# Patient Record
Sex: MALE | Race: BLACK OR AFRICAN AMERICAN
[De-identification: names, ages, dates, MRNs, and addresses within clinical notes are randomized per-mention and may not be internally consistent; named-entity substitution may affect disease eponyms.]

---

## 2020-05-14 ENCOUNTER — HOSPITAL ENCOUNTER (OUTPATIENT)
Dept: HOSPITAL 100 - ED | Age: 30
Setting detail: OBSERVATION
LOS: 1 days | Discharge: HOME | End: 2020-05-15
Payer: MEDICAID

## 2020-05-14 VITALS — BODY MASS INDEX: 22 KG/M2 | BODY MASS INDEX: 23.3 KG/M2

## 2020-05-14 VITALS
SYSTOLIC BLOOD PRESSURE: 143 MMHG | HEART RATE: 64 BPM | OXYGEN SATURATION: 96 % | RESPIRATION RATE: 16 BRPM | DIASTOLIC BLOOD PRESSURE: 107 MMHG

## 2020-05-14 VITALS
RESPIRATION RATE: 16 BRPM | OXYGEN SATURATION: 97 % | DIASTOLIC BLOOD PRESSURE: 66 MMHG | SYSTOLIC BLOOD PRESSURE: 112 MMHG | HEART RATE: 60 BPM

## 2020-05-14 VITALS
SYSTOLIC BLOOD PRESSURE: 176 MMHG | HEART RATE: 104 BPM | TEMPERATURE: 99.4 F | DIASTOLIC BLOOD PRESSURE: 116 MMHG | OXYGEN SATURATION: 96 % | RESPIRATION RATE: 16 BRPM

## 2020-05-14 VITALS
HEART RATE: 68 BPM | RESPIRATION RATE: 16 BRPM | SYSTOLIC BLOOD PRESSURE: 113 MMHG | DIASTOLIC BLOOD PRESSURE: 64 MMHG | OXYGEN SATURATION: 98 %

## 2020-05-14 DIAGNOSIS — G40.909: Primary | ICD-10-CM

## 2020-05-14 DIAGNOSIS — F17.200: ICD-10-CM

## 2020-05-14 DIAGNOSIS — Z79.899: ICD-10-CM

## 2020-05-14 LAB
ALANINE AMINOTRANSFER ALT/SGPT: 24 U/L (ref 16–61)
ALBUMIN SERPL-MCNC: 4.4 G/DL (ref 3.2–5)
ALCOHOL, BLOOD (MEDICAL)-SERUM: < 3 MG/DL
ALKALINE PHOSPHATASE: 45 U/L (ref 45–117)
AMPHET UR-MCNC: NEGATIVE NG/ML
ANION GAP: 9 (ref 5–15)
AST(SGOT): 25 U/L (ref 15–37)
BARBITURATE URINE VISTA: NEGATIVE
BENZODIAZEPINE URINE VISTA: NEGATIVE
BILIRUB DIRECT SERPL-MCNC: 0.2 MG/DL (ref 0–0.3)
BUN SERPL-MCNC: 10 MG/DL (ref 7–18)
BUN/CREAT RATIO: 7.7 RATIO (ref 10–20)
CALCIUM SERPL-MCNC: 9.7 MG/DL (ref 8.5–10.1)
CARBON DIOXIDE: 26 MMOL/L (ref 21–32)
CHLORIDE: 103 MMOL/L (ref 98–107)
COCAINE URINE VISTA: NEGATIVE
DEPRECATED RDW RBC: 38.4 FL (ref 35.1–43.9)
DRUG CONFIRMATION TO FOLLOW?: (no result)
ECSTACY URINE VISTA: NEGATIVE
ERYTHROCYTE [DISTWIDTH] IN BLOOD: 14.5 % (ref 11.6–14.6)
EST GLOM FILT RATE - AFR AMER: 90 ML/MIN (ref 60–?)
ESTIMATED CREATININE CLEARANCE: 102.44 ML/MIN
GLOBULIN: 4.3 G/DL (ref 2.2–4.2)
GLUCOSE: 136 MG/DL (ref 74–106)
HCT VFR BLD AUTO: 40.9 % (ref 40–54)
HEMOGLOBIN: 12.7 G/DL (ref 13–16.5)
HGB BLD-MCNC: 12.7 G/DL (ref 13–16.5)
IMMATURE GRANULOCYTES COUNT: 0.02 X10^3/UL (ref 0–0)
KETONE-DIPSTICK: 5 MG/DL
MCV RBC: 74.2 FL (ref 80–94)
MEAN CORP HGB CONC: 31.1 G/DL (ref 32–36)
MEAN PLATELET VOL.: 10.5 FL (ref 6.2–12)
METHADONE URINE VISTA: NEGATIVE
MUCOUS THREADS URNS QL MICRO: (no result) /HPF
NRBC FLAGGED BY ANALYZER: 0 % (ref 0–5)
PCP UR QL: NEGATIVE
PH UR: 6 [PH]
PLATELET # BLD: 222 K/MM3 (ref 150–450)
PLATELET COUNT: 222 K/MM3 (ref 150–450)
POTASSIUM: 3.1 MMOL/L (ref 3.5–5.1)
PROT UR QL STRIP.AUTO: 30 MG/DL
RBC # BLD AUTO: 5.51 M/MM3 (ref 4.6–6.2)
RBC DISTRIBUTION WIDTH CV: 14.5 % (ref 11.6–14.6)
RBC DISTRIBUTION WIDTH SD: 38.4 FL (ref 35.1–43.9)
RBC UR QL: (no result) /HPF (ref 0–5)
SP GR UR: 1.01 (ref 1–1.03)
SQUAMOUS URNS QL MICRO: (no result) /HPF (ref 0–5)
THC URINE VISTA: POSITIVE
URINE PRESERVATIVE: (no result)
WBC # BLD AUTO: 8.2 K/MM3 (ref 4.4–11)
WHITE BLOOD COUNT: 8.2 K/MM3 (ref 4.4–11)

## 2020-05-14 PROCEDURE — 36415 COLL VENOUS BLD VENIPUNCTURE: CPT

## 2020-05-14 PROCEDURE — 80048 BASIC METABOLIC PNL TOTAL CA: CPT

## 2020-05-14 PROCEDURE — G0480 DRUG TEST DEF 1-7 CLASSES: HCPCS

## 2020-05-14 PROCEDURE — 99285 EMERGENCY DEPT VISIT HI MDM: CPT

## 2020-05-14 PROCEDURE — 99218: CPT

## 2020-05-14 PROCEDURE — 96361 HYDRATE IV INFUSION ADD-ON: CPT

## 2020-05-14 PROCEDURE — 70450 CT HEAD/BRAIN W/O DYE: CPT

## 2020-05-14 PROCEDURE — A4216 STERILE WATER/SALINE, 10 ML: HCPCS

## 2020-05-14 PROCEDURE — 84443 ASSAY THYROID STIM HORMONE: CPT

## 2020-05-14 PROCEDURE — 97802 MEDICAL NUTRITION INDIV IN: CPT

## 2020-05-14 PROCEDURE — G0378 HOSPITAL OBSERVATION PER HR: HCPCS

## 2020-05-14 PROCEDURE — 80307 DRUG TEST PRSMV CHEM ANLYZR: CPT

## 2020-05-14 PROCEDURE — 80320 DRUG SCREEN QUANTALCOHOLS: CPT

## 2020-05-14 PROCEDURE — 80076 HEPATIC FUNCTION PANEL: CPT

## 2020-05-14 PROCEDURE — 81001 URINALYSIS AUTO W/SCOPE: CPT

## 2020-05-14 PROCEDURE — 96375 TX/PRO/DX INJ NEW DRUG ADDON: CPT

## 2020-05-14 PROCEDURE — 96372 THER/PROPH/DIAG INJ SC/IM: CPT

## 2020-05-14 PROCEDURE — 83735 ASSAY OF MAGNESIUM: CPT

## 2020-05-14 PROCEDURE — 83605 ASSAY OF LACTIC ACID: CPT

## 2020-05-14 PROCEDURE — 85025 COMPLETE CBC W/AUTO DIFF WBC: CPT

## 2020-05-14 PROCEDURE — 93005 ELECTROCARDIOGRAM TRACING: CPT

## 2020-05-14 PROCEDURE — 96365 THER/PROPH/DIAG IV INF INIT: CPT

## 2020-05-14 RX ADMIN — LEVETIRACETAM 400 MG: 1000 INJECTION, SOLUTION INTRAVENOUS at 21:16

## 2020-05-14 RX ADMIN — SODIUM CHLORIDE 999 ML: 9 INJECTION, SOLUTION INTRAVENOUS at 22:00

## 2020-05-14 RX ADMIN — ZIPRASIDONE MESYLATE 20 MG: 20 INJECTION, POWDER, LYOPHILIZED, FOR SOLUTION INTRAMUSCULAR at 20:49

## 2020-05-15 VITALS
RESPIRATION RATE: 23 BRPM | DIASTOLIC BLOOD PRESSURE: 103 MMHG | TEMPERATURE: 98.24 F | SYSTOLIC BLOOD PRESSURE: 144 MMHG | OXYGEN SATURATION: 100 % | HEART RATE: 76 BPM

## 2020-05-15 VITALS
OXYGEN SATURATION: 97 % | RESPIRATION RATE: 18 BRPM | TEMPERATURE: 99.6 F | DIASTOLIC BLOOD PRESSURE: 96 MMHG | SYSTOLIC BLOOD PRESSURE: 159 MMHG | HEART RATE: 68 BPM

## 2020-05-15 VITALS
TEMPERATURE: 99.6 F | SYSTOLIC BLOOD PRESSURE: 159 MMHG | HEART RATE: 68 BPM | OXYGEN SATURATION: 97 % | DIASTOLIC BLOOD PRESSURE: 96 MMHG | RESPIRATION RATE: 18 BRPM

## 2020-05-15 VITALS
OXYGEN SATURATION: 100 % | HEART RATE: 63 BPM | TEMPERATURE: 99.3 F | RESPIRATION RATE: 26 BRPM | SYSTOLIC BLOOD PRESSURE: 156 MMHG | DIASTOLIC BLOOD PRESSURE: 95 MMHG

## 2020-05-15 VITALS
HEART RATE: 58 BPM | TEMPERATURE: 96.98 F | RESPIRATION RATE: 15 BRPM | SYSTOLIC BLOOD PRESSURE: 121 MMHG | OXYGEN SATURATION: 96 % | DIASTOLIC BLOOD PRESSURE: 83 MMHG

## 2020-05-15 VITALS
SYSTOLIC BLOOD PRESSURE: 121 MMHG | RESPIRATION RATE: 16 BRPM | OXYGEN SATURATION: 96 % | HEART RATE: 60 BPM | DIASTOLIC BLOOD PRESSURE: 99 MMHG

## 2020-05-15 VITALS — HEART RATE: 85 BPM

## 2020-05-15 VITALS — HEART RATE: 70 BPM

## 2020-05-15 VITALS
OXYGEN SATURATION: 99 % | HEART RATE: 99 BPM | SYSTOLIC BLOOD PRESSURE: 145 MMHG | TEMPERATURE: 99.8 F | DIASTOLIC BLOOD PRESSURE: 85 MMHG | RESPIRATION RATE: 25 BRPM

## 2020-05-15 VITALS — HEART RATE: 99 BPM

## 2020-05-15 VITALS — HEART RATE: 71 BPM

## 2020-05-15 LAB
ANION GAP: 7 (ref 5–15)
BUN SERPL-MCNC: 8 MG/DL (ref 7–18)
BUN/CREAT RATIO: 9.1 RATIO (ref 10–20)
CALCIUM SERPL-MCNC: 9 MG/DL (ref 8.5–10.1)
CARBON DIOXIDE: 27 MMOL/L (ref 21–32)
CHLORIDE: 104 MMOL/L (ref 98–107)
EST GLOM FILT RATE - AFR AMER: 133 ML/MIN (ref 60–?)
ESTIMATED CREATININE CLEARANCE: 133.99 ML/MIN
GLUCOSE: 83 MG/DL (ref 74–106)
MAGNESIUM: 2.2 MG/DL (ref 1.6–2.6)
POTASSIUM: 3.7 MMOL/L (ref 3.5–5.1)

## 2020-05-15 RX ADMIN — LEVETIRACETAM 1000 MG: 100 SOLUTION ORAL at 10:24

## 2020-05-15 RX ADMIN — SODIUM CHLORIDE, PRESERVATIVE FREE 0 ML: 5 INJECTION INTRAVENOUS at 02:36

## 2020-05-15 RX ADMIN — SODIUM CHLORIDE 150 ML: 9 INJECTION, SOLUTION INTRAVENOUS at 02:36

## 2022-04-09 ENCOUNTER — HISTORICAL (OUTPATIENT)
Dept: ADMINISTRATIVE | Facility: HOSPITAL | Age: 32
End: 2022-04-09
Payer: MEDICAID

## 2022-04-26 VITALS
WEIGHT: 225.75 LBS | BODY MASS INDEX: 30.58 KG/M2 | HEIGHT: 72 IN | SYSTOLIC BLOOD PRESSURE: 142 MMHG | DIASTOLIC BLOOD PRESSURE: 96 MMHG

## 2022-05-26 NOTE — TELEPHONE ENCOUNTER
----- Message from Brittani Vieyra sent at 5/25/2022  1:37 PM CDT -----  Regarding: Call pt  Pt would like a call he has a question about his medication Keppra. He can be reached @ [phone].          Thank You  Radha SORIA

## 2022-05-27 RX ORDER — LEVETIRACETAM 500 MG/1
500 TABLET ORAL 2 TIMES DAILY
COMMUNITY
Start: 2022-05-07 | End: 2022-05-27 | Stop reason: SDUPTHER

## 2022-05-27 RX ORDER — LEVETIRACETAM 500 MG/1
500 TABLET ORAL 2 TIMES DAILY
Qty: 60 TABLET | Refills: 3 | Status: SHIPPED | OUTPATIENT
Start: 2022-05-27 | End: 2022-12-29 | Stop reason: SDUPTHER

## 2022-05-27 NOTE — TELEPHONE ENCOUNTER
Patient was scheduled for 5/27/2022, but had to R/S.    Last OV 4/6/2021  Next OV 8/17/2022    Informed patient will only have enough refills until next appointment.    Mr Mendosa verbalized understanding.

## 2022-08-16 PROBLEM — G40.309 IDIOPATHIC GENERALIZED EPILEPSY: Status: ACTIVE | Noted: 2022-08-16

## 2022-08-16 PROBLEM — G40.309 IDIOPATHIC GENERALIZED EPILEPSY: Chronic | Status: ACTIVE | Noted: 2022-08-16

## 2022-12-29 RX ORDER — LEVETIRACETAM 500 MG/1
500 TABLET ORAL 2 TIMES DAILY
Qty: 60 TABLET | Refills: 3 | Status: SHIPPED | OUTPATIENT
Start: 2022-12-29 | End: 2023-03-30 | Stop reason: SDUPTHER

## 2022-12-29 NOTE — TELEPHONE ENCOUNTER
----- Message from Brittani Vieyra sent at 12/29/2022  3:51 PM CST -----  Regarding: med refill  Pt called for a refill of KEPPRA to be sent to [facility] in [place].  Pt missed his appt's due to being in college and working and is r/s for 3/30/23. Pt would also like to know if he is a candidate for telemed as he lives in [place].Pt can be reached @ [phone]        Thank You  Radha GU

## 2023-03-29 NOTE — PROGRESS NOTES
"[facility] Neurology Follow Up Office Visit Note    Last Visit Date: 4/6/2021  Current Visit Date:  03/30/2023    Chief Complaint:     Chief Complaint   Patient presents with    Seizures     Has not had any seizures in almost a year; Medications working well       History of Present Illness:      This is 32 y.o. right hand dominant male with history of Depression, HTN, who is referred for generalized seizure disorder, currently stable. Has since been lost to follow up.      Age of Onset - 25 year old    Semiology - LOC. Woke up with nausea and muscle achiness. Mostly nocturnal but had 1 while awake    Frequency - last seizure was 6/4/2021 when he misses a dose of Keppra    Provocation Factors - when he misses a dose    Risk Factors -  - Family history of seizures? denied  - History of focal CNS lesions? denied  - History of CNS infection? denied  - History of Head Trauma with prolonged LOC? denied  - History of childhood seizures, including febrile seizures? denied  - History of developmental delay? denied  - History of underlying mood disorder? Depression. "Cranky" States that he is having mood swings  - Illicit drug use? denied  - Alcohol use? denied    Medications:     Current AEDs:   mg twice a day: states good compliance.     Prior AEDs:  Denied     Surgical Intervention & Devices:     - VNS:  - DBS  - RNS:  - Lobectomy:    Labs:     No results found for this or any previous visit.    Studies:      - routine EEG 5/2020 @ [facility] - intermittent diffuse slowing.  - [facility] 8/2017 - I have reviewed the study independently and with the patient. Unremarkable.    Review of Systems:     All other systems reviewed and are negative.    Physical Exams:     Vitals:    03/30/23 1439   BP: 138/78   Pulse: 63   Resp: 14   Temp: 97.8 °F (36.6 °C)       Physical Exam  Vitals and nursing note reviewed.   Constitutional:       Appearance: Normal appearance.   HENT:      Head: Normocephalic and atraumatic.      Nose: Nose normal.     "  Mouth/Throat:      Mouth: Mucous membranes are moist.      Pharynx: Oropharynx is clear.   Eyes:      Conjunctiva/sclera: Conjunctivae normal.   Cardiovascular:      Rate and Rhythm: Normal rate and regular rhythm.      Pulses: Normal pulses.      Heart sounds: Normal heart sounds.   Pulmonary:      Effort: Pulmonary effort is normal.      Breath sounds: Normal breath sounds.   Abdominal:      General: Abdomen is flat.      Palpations: Abdomen is soft.   Musculoskeletal:         General: Normal range of motion.      Cervical back: Normal range of motion.   Neurological:      Mental Status: He is alert.   Psychiatric:         Mood and Affect: Mood normal.            Comprehensive Neurological Exam:  Mental Status: Alert Oriented to Self, Date, and Place. Naming, repetition, reading, and writing wnl. Comprehension wnl. No dysarthria.   CN II - XII: BIBI, No APD, VA grossly intact to finger counting at 6 ft, VFFC, No ptosis OU, EOMI without nystagmus, LT/Temp symmetric in CN V1-3 distribution, Hearing grossly intact, Face Symmetric, Tongue and Uvula midline, Trapezius symmetric bilateral.   Motor: tone and bulk wnl throughout, no abnormal involuntary or voluntary movements, 5/5 to confrontation, Fine finger movements wnl b/l, No pronator drift.   Sensory: LT, Proprioception, Vibration, PP, Temp symmetric. No sensory simultagnosia.   Reflexes: 2+ throughout, plantar reflexes downward bilateral.   Cerebellar: FNF wnl b/l, RAHM wnl b/l,   Romberg: Negative  Gait: normal. Heel Gait, Toe Gait, Tandem Gait wnl.     Assessment:     This is 32 y.o. right hand dominant male with history of Depression, HTN, who is referred for generalized seizure disorder, currently stable.    Problem List Items Addressed This Visit          Neuro    Idiopathic generalized epilepsy - Primary (Chronic)       Plan:     [] c/w Keppra 500mg BID    RTC 12 months  with NP    Seizure education provided: including family planning and teratogenicity of  AEDs, risk of uncontrolled seizure disorder, SUDEP. No driving until seizure free for six months ([place] state law). No swimming, climbing heights, or operate heavy machinery without supervision. Patient is advised to avoid Benadryl, Tramadol, Wellbutrin, flashing lights, alcohol, sleep deprivation, and certain anti-biotics that can lower seizure threshold.     I have explained the treatment plan, diagnosis, and prognosis to patient. All questions are answered to the best of my knowledge.     Face to face time 30 minutes, including documentation, chart review, counseling, education, review of test results, relevant medical records, and coordination of care.   I have explained the treatment plan, diagnosis, and prognosis to patient. All questions are answered to the best of my knowledge.         Hannah Cerrato MD   General Neurology  03/30/2023

## 2023-03-30 ENCOUNTER — OFFICE VISIT (OUTPATIENT)
Dept: NEUROLOGY | Facility: CLINIC | Age: 33
End: 2023-03-30
Payer: MEDICAID

## 2023-03-30 VITALS
DIASTOLIC BLOOD PRESSURE: 78 MMHG | TEMPERATURE: 98 F | RESPIRATION RATE: 14 BRPM | SYSTOLIC BLOOD PRESSURE: 138 MMHG | HEART RATE: 63 BPM | WEIGHT: 222 LBS | BODY MASS INDEX: 30.07 KG/M2 | OXYGEN SATURATION: 97 % | HEIGHT: 72 IN

## 2023-03-30 DIAGNOSIS — G40.309 NONINTRACTABLE GENERALIZED IDIOPATHIC EPILEPSY WITHOUT STATUS EPILEPTICUS: Primary | Chronic | ICD-10-CM

## 2023-03-30 PROCEDURE — 99204 OFFICE O/P NEW MOD 45 MIN: CPT | Mod: S$PBB,,, | Performed by: PSYCHIATRY & NEUROLOGY

## 2023-03-30 PROCEDURE — 99204 PR OFFICE/OUTPT VISIT, NEW, LEVL IV, 45-59 MIN: ICD-10-PCS | Mod: S$PBB,,, | Performed by: PSYCHIATRY & NEUROLOGY

## 2023-03-30 PROCEDURE — 1159F PR MEDICATION LIST DOCUMENTED IN MEDICAL RECORD: ICD-10-PCS | Mod: CPTII,,, | Performed by: PSYCHIATRY & NEUROLOGY

## 2023-03-30 PROCEDURE — 3078F DIAST BP <80 MM HG: CPT | Mod: CPTII,,, | Performed by: PSYCHIATRY & NEUROLOGY

## 2023-03-30 PROCEDURE — 3078F PR MOST RECENT DIASTOLIC BLOOD PRESSURE < 80 MM HG: ICD-10-PCS | Mod: CPTII,,, | Performed by: PSYCHIATRY & NEUROLOGY

## 2023-03-30 PROCEDURE — 3075F SYST BP GE 130 - 139MM HG: CPT | Mod: CPTII,,, | Performed by: PSYCHIATRY & NEUROLOGY

## 2023-03-30 PROCEDURE — 3075F PR MOST RECENT SYSTOLIC BLOOD PRESS GE 130-139MM HG: ICD-10-PCS | Mod: CPTII,,, | Performed by: PSYCHIATRY & NEUROLOGY

## 2023-03-30 PROCEDURE — 99213 OFFICE O/P EST LOW 20 MIN: CPT | Mod: PBBFAC | Performed by: PSYCHIATRY & NEUROLOGY

## 2023-03-30 PROCEDURE — 3008F BODY MASS INDEX DOCD: CPT | Mod: CPTII,,, | Performed by: PSYCHIATRY & NEUROLOGY

## 2023-03-30 PROCEDURE — 1159F MED LIST DOCD IN RCRD: CPT | Mod: CPTII,,, | Performed by: PSYCHIATRY & NEUROLOGY

## 2023-03-30 PROCEDURE — 3008F PR BODY MASS INDEX (BMI) DOCUMENTED: ICD-10-PCS | Mod: CPTII,,, | Performed by: PSYCHIATRY & NEUROLOGY

## 2023-03-30 RX ORDER — TALC
1 POWDER (GRAM) TOPICAL
COMMUNITY
End: 2023-07-19

## 2023-03-30 RX ORDER — LEVETIRACETAM 500 MG/1
500 TABLET ORAL 2 TIMES DAILY
Qty: 60 TABLET | Refills: 12 | Status: SHIPPED | OUTPATIENT
Start: 2023-03-30

## 2023-04-10 ENCOUNTER — TELEPHONE (OUTPATIENT)
Dept: NEUROLOGY | Facility: CLINIC | Age: 33
End: 2023-04-10
Payer: MEDICAID

## 2023-04-10 NOTE — TELEPHONE ENCOUNTER
----- Message from Hannah Cerrato MD sent at 4/10/2023  7:22 AM CDT -----  Regarding: FW: Questions and Concerns    ----- Message -----  From: Dottie Mcbride  Sent: 4/7/2023   8:34 AM CDT  To: Saqib Young Staff  Subject: Questions and Concerns                           Patient is asking to speak to the Doctor has questions and concerns

## 2023-04-10 NOTE — TELEPHONE ENCOUNTER
Patient called and has a few questions regarding his Keppra medication, patient questioned if he will have any problems mixing the Keppra with other medication such as melatonin or tylenol? Patient states he hasn't have any side affects to the medication but he did notice he was having trouble sleeping and he wants to know if there is a certain dosage he can take.     Patient is also requesting his results for his scans regarding how his seizure happened in the first place. Patient sounded a little concerned on the phone. He is requesting a callback

## 2023-07-13 ENCOUNTER — TELEPHONE (OUTPATIENT)
Dept: NEUROLOGY | Facility: CLINIC | Age: 33
End: 2023-07-13
Payer: MEDICAID

## 2023-07-13 NOTE — TELEPHONE ENCOUNTER
----- Message from Shante Jim sent at 7/13/2023  8:10 AM CDT -----  Regarding: Sooner Appoitment ?  Patient called and stated he had questions about the medication levETIRAcetam (KEPPRA) 500 MG Tab about the dosage.   Patient stated he was on another medication before this , but doesn't remember the name.    Patient would like a sooner appointment than his 1 year follow up in April of 2024.     Patient would like a call back to speak with a nurse and can be reached at [phone]    Please Advise     Thank You

## 2023-07-17 NOTE — TELEPHONE ENCOUNTER
Patient contacted and verbalized understanding of his medications and insurance. Patient is requesting to be seen sooner by Ms. Loni Ortega or to be out on the cancellation list

## 2023-07-19 ENCOUNTER — OFFICE VISIT (OUTPATIENT)
Dept: NEUROLOGY | Facility: CLINIC | Age: 33
End: 2023-07-19
Payer: MEDICAID

## 2023-07-19 VITALS
OXYGEN SATURATION: 98 % | HEART RATE: 55 BPM | DIASTOLIC BLOOD PRESSURE: 90 MMHG | SYSTOLIC BLOOD PRESSURE: 150 MMHG | HEIGHT: 72 IN | BODY MASS INDEX: 28.85 KG/M2 | WEIGHT: 213 LBS

## 2023-07-19 DIAGNOSIS — G40.309 NONINTRACTABLE GENERALIZED IDIOPATHIC EPILEPSY WITHOUT STATUS EPILEPTICUS: Primary | Chronic | ICD-10-CM

## 2023-07-19 PROCEDURE — 3008F PR BODY MASS INDEX (BMI) DOCUMENTED: ICD-10-PCS | Mod: CPTII,,, | Performed by: NURSE PRACTITIONER

## 2023-07-19 PROCEDURE — 1159F PR MEDICATION LIST DOCUMENTED IN MEDICAL RECORD: ICD-10-PCS | Mod: CPTII,,, | Performed by: NURSE PRACTITIONER

## 2023-07-19 PROCEDURE — 1160F PR REVIEW ALL MEDS BY PRESCRIBER/CLIN PHARMACIST DOCUMENTED: ICD-10-PCS | Mod: CPTII,,, | Performed by: NURSE PRACTITIONER

## 2023-07-19 PROCEDURE — 3077F SYST BP >= 140 MM HG: CPT | Mod: CPTII,,, | Performed by: NURSE PRACTITIONER

## 2023-07-19 PROCEDURE — 1159F MED LIST DOCD IN RCRD: CPT | Mod: CPTII,,, | Performed by: NURSE PRACTITIONER

## 2023-07-19 PROCEDURE — 3080F PR MOST RECENT DIASTOLIC BLOOD PRESSURE >= 90 MM HG: ICD-10-PCS | Mod: CPTII,,, | Performed by: NURSE PRACTITIONER

## 2023-07-19 PROCEDURE — 3008F BODY MASS INDEX DOCD: CPT | Mod: CPTII,,, | Performed by: NURSE PRACTITIONER

## 2023-07-19 PROCEDURE — 3080F DIAST BP >= 90 MM HG: CPT | Mod: CPTII,,, | Performed by: NURSE PRACTITIONER

## 2023-07-19 PROCEDURE — 99215 PR OFFICE/OUTPT VISIT, EST, LEVL V, 40-54 MIN: ICD-10-PCS | Mod: S$PBB,,, | Performed by: NURSE PRACTITIONER

## 2023-07-19 PROCEDURE — 99215 OFFICE O/P EST HI 40 MIN: CPT | Mod: S$PBB,,, | Performed by: NURSE PRACTITIONER

## 2023-07-19 PROCEDURE — 3077F PR MOST RECENT SYSTOLIC BLOOD PRESSURE >= 140 MM HG: ICD-10-PCS | Mod: CPTII,,, | Performed by: NURSE PRACTITIONER

## 2023-07-19 PROCEDURE — 1160F RVW MEDS BY RX/DR IN RCRD: CPT | Mod: CPTII,,, | Performed by: NURSE PRACTITIONER

## 2023-07-19 PROCEDURE — 99214 OFFICE O/P EST MOD 30 MIN: CPT | Mod: PBBFAC | Performed by: NURSE PRACTITIONER

## 2023-07-19 NOTE — PROGRESS NOTES
"[facility] Neurology Follow Up Office Visit Note    Initial Visit Date: 7/6/2020  Last Visit Date: 3/30/2023  Current Visit Date:  07/19/2023    Chief Complaint:     Chief Complaint   Patient presents with    Follow-up     Pt reports recent flu like symptoms so he went to ER, however he states they diagnosed him with anxiety and insomnia. He is concerned of relation to keppra, (See notes in recent telephone encounter) but also doubts that it is.  Pt interested in medications to help with these symptoms.       History of Present Illness:      This is 32 y.o. right hand dominant male with history of Depression, HTN, who was referred for generalized seizure disorder, currently stable. During last visit, Keppra 500mg BID was continued.    Today, Pt states he presented to ED for flu-like symptoms and sleep deprivation for 3 days. Was provided information for psychiatric services and recommended to take Nyquil for sleep. Has tried Melatonin 3mg nightly in past with little improvement in sleep. States she does not know what it is to be anxious. 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 Admits to smoking marijuana on occasion. Denies alcohol intake. Current resides in [place].     Age of Onset - 25 year old    Semiology - LOC. Woke up with nausea and muscle achiness. Mostly nocturnal but had 1 while awake    Frequency - last seizure was 6/4/2021 when he misses a dose of Keppra    Provocation Factors - when he misses a dose    Risk Factors -  - Family history of seizures? denied  - History of focal CNS lesions? denied  - History of CNS infection? denied  - History of Head Trauma with prolonged LOC? denied  - History of childhood seizures, including febrile seizures? denied  - History of developmental delay? denied  - History of underlying mood disorder? Depression. "Jessie" States that he is having mood swings  - Illicit drug use? denied  - Alcohol use? denied    Medications:     Current " AEDs:   mg twice a day: states good compliance.     Prior AEDs:  Denied     Surgical Intervention & Devices:     - VNS:  - DBS  - RNS:  - Lobectomy:    Labs:     No results found for this or any previous visit.    Studies:      - routine EEG 5/2020 @ [facility] - intermittent diffuse slowing.  - [facility]CT 8/2017 - I have reviewed the study independently and with the patient. Unremarkable.    Review of Systems:     All other systems reviewed and are negative.    Physical Exams:     Vitals:    07/19/23 0925   BP: (!) 150/90   Pulse:      Physical Exam  Vitals and nursing note reviewed.   Constitutional:       Appearance: Normal appearance.   HENT:      Head: Normocephalic and atraumatic.      Nose: Nose normal.      Mouth/Throat:      Mouth: Mucous membranes are moist.      Pharynx: Oropharynx is clear.   Eyes:      Conjunctiva/sclera: Conjunctivae normal.   Cardiovascular:      Rate and Rhythm: Normal rate and regular rhythm.      Pulses: Normal pulses.      Heart sounds: Normal heart sounds.   Pulmonary:      Effort: Pulmonary effort is normal.      Breath sounds: Normal breath sounds.   Abdominal:      General: Abdomen is flat.      Palpations: Abdomen is soft.   Musculoskeletal:         General: Normal range of motion.      Cervical back: Normal range of motion.   Neurological:      Mental Status: He is alert.   Psychiatric:         Mood and Affect: Mood normal.      Comprehensive Neurological Exam:  Mental Status: Alert Oriented to Self, Date, and Place. Naming, repetition, reading, and writing wnl. Comprehension wnl. No dysarthria.   CN II - XII: BIBI, No APD, VA grossly intact to finger counting at 6 ft, VFFC, No ptosis OU, EOMI without nystagmus, LT/Temp symmetric in CN V1-3 distribution, Hearing grossly intact, Face Symmetric, Tongue and Uvula midline, Trapezius symmetric bilateral.   Motor: tone and bulk wnl throughout, no abnormal involuntary or voluntary movements, 5/5 to confrontation, Fine finger movements  wnl b/l, No pronator drift.   Sensory: LT, Proprioception, Vibration, PP, Temp symmetric. No sensory simultagnosia.   Reflexes: 2+ throughout, plantar reflexes downward bilateral.   Cerebellar: FNF wnl b/l, RAHM wnl b/l,   Romberg: Negative  Gait: normal, bilat arm swing intact    Assessment:     This is 32 y.o. right hand dominant male with history of Depression, HTN, who was referred for generalized seizure disorder, currently stable. Presented to office today concerned about flu-like symptoms and contraindication w/taking Keppra. Taking Nyquil for sleep as recommended by ED. Currently enrolled at [facility]. Discussed need for PCP for HTN and mental health for stress.    Problem List Items Addressed This Visit          Neuro    Idiopathic generalized epilepsy - Primary (Chronic)     Plan:     [] c/w Keppra 500mg BID  [] seek mental health provider  [] call [facility] for family physician for HTN    RTC 12 months    Seizure education provided: including family planning and teratogenicity of AEDs, risk of uncontrolled seizure disorder, SUDEP. No driving until seizure free for six months ([place] state law). No swimming, climbing heights, or operate heavy machinery without supervision. Patient is advised to avoid Benadryl, Tramadol, Wellbutrin, flashing lights, alcohol, sleep deprivation, and certain anti-biotics that can lower seizure threshold.     I have explained the treatment plan, diagnosis, and prognosis to patient. All questions are answered to the best of my knowledge.     Face to face time 40 minutes, including documentation, chart review, counseling, education, review of test results, relevant medical records, and coordination of care.     I have explained the treatment plan, diagnosis, and prognosis to patient. All questions are answered to the best of my knowledge.   07/19/2023

## 2024-01-05 NOTE — TELEPHONE ENCOUNTER
Contacted the patient and he verified that he went to the ER recently and they explained to him that he was showing symptoms of anxiety and insomnia. Patient stated that he never noticed these signs until after starting the Keppra. Patient is requesting to know if the Keppra would have anything to do with those symptoms. Patient was also told to follow up for the anxiety and insomnia and I mentioned to the patient that he needs to find a PCP for that kind of dx. Patient is requesting if we can put in a referral.   Detail Level: Simple Show Applicator Variable?: Yes Consent: The patient's consent was obtained including but not limited to risks of crusting, scabbing, blistering, scarring, darker or lighter pigmentary change, recurrence, incomplete removal and infection. Render Note In Bullet Format When Appropriate: No Number Of Freeze-Thaw Cycles: 1 freeze-thaw cycle Duration Of Freeze Thaw-Cycle (Seconds): 0 Post-Care Instructions: I reviewed with the patient in detail post-care instructions. Patient is to wear sunprotection, and avoid picking at any of the treated lesions. Pt may apply Vaseline to crusted or scabbing areas.

## 2024-04-16 DIAGNOSIS — G40.309 NONINTRACTABLE GENERALIZED IDIOPATHIC EPILEPSY WITHOUT STATUS EPILEPTICUS: Chronic | ICD-10-CM

## 2024-04-16 RX ORDER — LEVETIRACETAM 500 MG/1
500 TABLET ORAL 2 TIMES DAILY
Qty: 60 TABLET | Refills: 2 | Status: SHIPPED | OUTPATIENT
Start: 2024-04-16